# Patient Record
Sex: MALE | Race: WHITE | Employment: OTHER | URBAN - METROPOLITAN AREA
[De-identification: names, ages, dates, MRNs, and addresses within clinical notes are randomized per-mention and may not be internally consistent; named-entity substitution may affect disease eponyms.]

---

## 2023-10-10 ENCOUNTER — HOSPITAL ENCOUNTER (EMERGENCY)
Facility: HOSPITAL | Age: 78
Discharge: ANOTHER HOSPITAL | End: 2023-10-10
Attending: EMERGENCY MEDICINE

## 2023-10-10 ENCOUNTER — APPOINTMENT (OUTPATIENT)
Dept: CT IMAGING | Facility: HOSPITAL | Age: 78
End: 2023-10-10
Attending: EMERGENCY MEDICINE

## 2023-10-10 ENCOUNTER — TELEPHONE (OUTPATIENT)
Dept: CASE MANAGEMENT | Facility: HOSPITAL | Age: 78
End: 2023-10-10

## 2023-10-10 VITALS
OXYGEN SATURATION: 94 % | WEIGHT: 154.31 LBS | SYSTOLIC BLOOD PRESSURE: 149 MMHG | DIASTOLIC BLOOD PRESSURE: 78 MMHG | HEART RATE: 92 BPM | RESPIRATION RATE: 23 BRPM | TEMPERATURE: 99 F

## 2023-10-10 DIAGNOSIS — I73.9 PAD (PERIPHERAL ARTERY DISEASE) (HCC): Primary | ICD-10-CM

## 2023-10-10 DIAGNOSIS — I71.43 INFRARENAL ABDOMINAL AORTIC ANEURYSM (AAA) WITHOUT RUPTURE (HCC): ICD-10-CM

## 2023-10-10 LAB
ALBUMIN SERPL-MCNC: 3.1 G/DL (ref 3.4–5)
ALP LIVER SERPL-CCNC: 75 U/L
ALT SERPL-CCNC: 20 U/L
ANION GAP SERPL CALC-SCNC: 7 MMOL/L (ref 0–18)
APTT PPP: 44.2 SECONDS (ref 23.3–35.6)
AST SERPL-CCNC: 53 U/L (ref 15–37)
BASOPHILS # BLD AUTO: 0.05 X10(3) UL (ref 0–0.2)
BASOPHILS NFR BLD AUTO: 0.3 %
BILIRUB DIRECT SERPL-MCNC: 0.3 MG/DL (ref 0–0.2)
BILIRUB SERPL-MCNC: 2.2 MG/DL (ref 0.1–2)
BUN BLD-MCNC: 24 MG/DL (ref 7–18)
BUN/CREAT SERPL: 19.4 (ref 10–20)
CALCIUM BLD-MCNC: 8.1 MG/DL (ref 8.5–10.1)
CHLORIDE SERPL-SCNC: 105 MMOL/L (ref 98–112)
CO2 SERPL-SCNC: 28 MMOL/L (ref 21–32)
CREAT BLD-MCNC: 1.24 MG/DL
DEPRECATED RDW RBC AUTO: 50.5 FL (ref 35.1–46.3)
EGFRCR SERPLBLD CKD-EPI 2021: 60 ML/MIN/1.73M2 (ref 60–?)
EOSINOPHIL # BLD AUTO: 0.02 X10(3) UL (ref 0–0.7)
EOSINOPHIL NFR BLD AUTO: 0.1 %
ERYTHROCYTE [DISTWIDTH] IN BLOOD BY AUTOMATED COUNT: 14 % (ref 11–15)
GLUCOSE BLD-MCNC: 111 MG/DL (ref 70–99)
HCT VFR BLD AUTO: 45.2 %
HGB BLD-MCNC: 15 G/DL
IMM GRANULOCYTES # BLD AUTO: 0.08 X10(3) UL (ref 0–1)
IMM GRANULOCYTES NFR BLD: 0.5 %
LYMPHOCYTES # BLD AUTO: 0.77 X10(3) UL (ref 1–4)
LYMPHOCYTES NFR BLD AUTO: 5.1 %
MCH RBC QN AUTO: 32.1 PG (ref 26–34)
MCHC RBC AUTO-ENTMCNC: 33.2 G/DL (ref 31–37)
MCV RBC AUTO: 96.8 FL
MONOCYTES # BLD AUTO: 0.86 X10(3) UL (ref 0.1–1)
MONOCYTES NFR BLD AUTO: 5.7 %
NEUTROPHILS # BLD AUTO: 13.34 X10 (3) UL (ref 1.5–7.7)
NEUTROPHILS # BLD AUTO: 13.34 X10(3) UL (ref 1.5–7.7)
NEUTROPHILS NFR BLD AUTO: 88.3 %
OSMOLALITY SERPL CALC.SUM OF ELEC: 295 MOSM/KG (ref 275–295)
PLATELET # BLD AUTO: 139 10(3)UL (ref 150–450)
POTASSIUM SERPL-SCNC: 4.4 MMOL/L (ref 3.5–5.1)
PROT SERPL-MCNC: 6.4 G/DL (ref 6.4–8.2)
RBC # BLD AUTO: 4.67 X10(6)UL
SODIUM SERPL-SCNC: 140 MMOL/L (ref 136–145)
WBC # BLD AUTO: 15.1 X10(3) UL (ref 4–11)

## 2023-10-10 PROCEDURE — 80048 BASIC METABOLIC PNL TOTAL CA: CPT | Performed by: EMERGENCY MEDICINE

## 2023-10-10 PROCEDURE — 93005 ELECTROCARDIOGRAM TRACING: CPT

## 2023-10-10 PROCEDURE — 85025 COMPLETE CBC W/AUTO DIFF WBC: CPT | Performed by: EMERGENCY MEDICINE

## 2023-10-10 PROCEDURE — 99291 CRITICAL CARE FIRST HOUR: CPT

## 2023-10-10 PROCEDURE — 96374 THER/PROPH/DIAG INJ IV PUSH: CPT

## 2023-10-10 PROCEDURE — 75635 CT ANGIO ABDOMINAL ARTERIES: CPT | Performed by: EMERGENCY MEDICINE

## 2023-10-10 PROCEDURE — 85730 THROMBOPLASTIN TIME PARTIAL: CPT | Performed by: EMERGENCY MEDICINE

## 2023-10-10 PROCEDURE — 80076 HEPATIC FUNCTION PANEL: CPT | Performed by: EMERGENCY MEDICINE

## 2023-10-10 PROCEDURE — 93010 ELECTROCARDIOGRAM REPORT: CPT

## 2023-10-10 RX ORDER — HEPARIN SODIUM AND DEXTROSE 10000; 5 [USP'U]/100ML; G/100ML
INJECTION INTRAVENOUS CONTINUOUS
Status: CANCELLED | OUTPATIENT
Start: 2023-10-11

## 2023-10-10 RX ORDER — HYDROCODONE BITARTRATE AND ACETAMINOPHEN 5; 325 MG/1; MG/1
1 TABLET ORAL AS NEEDED
Status: ON HOLD | COMMUNITY
End: 2023-10-13

## 2023-10-10 RX ORDER — HEPARIN SODIUM 1000 [USP'U]/ML
60 INJECTION, SOLUTION INTRAVENOUS; SUBCUTANEOUS ONCE
Status: COMPLETED | OUTPATIENT
Start: 2023-10-10 | End: 2023-10-10

## 2023-10-10 RX ORDER — HEPARIN SODIUM AND DEXTROSE 10000; 5 [USP'U]/100ML; G/100ML
12 INJECTION INTRAVENOUS ONCE
Status: DISCONTINUED | OUTPATIENT
Start: 2023-10-10 | End: 2023-10-11

## 2023-10-10 RX ORDER — DOCUSATE CALCIUM 240 MG
CAPSULE ORAL
COMMUNITY

## 2023-10-10 RX ORDER — MIRTAZAPINE 7.5 MG/1
TABLET, FILM COATED ORAL NIGHTLY
COMMUNITY

## 2023-10-10 RX ORDER — GABAPENTIN 100 MG/1
CAPSULE ORAL
COMMUNITY

## 2023-10-10 RX ORDER — ACETAMINOPHEN 325 MG/1
TABLET ORAL
COMMUNITY

## 2023-10-10 NOTE — ED QUICK NOTES
This RN unable to obtain pedal pulse on pt's R foot, including with doppler. MD notified, at bedside, also unable to obtain pedal pulse.

## 2023-10-10 NOTE — ED INITIAL ASSESSMENT (HPI)
Pt arrives via EMS from [facility] for RLE pain since today, pt denies trauma. Pt states the pain is from is R foot to his R knee. Pt is A&Ox4/4  Ems noted pt to be hypertensive with  and oxygen saturation at 86%RA, was placed on 2LPM with improvement to 97%. Pt denies SOB.

## 2023-10-11 ENCOUNTER — HOSPITAL ENCOUNTER (INPATIENT)
Facility: HOSPITAL | Age: 78
LOS: 5 days | Discharge: NURSING, REHAB OR LONG-TERM CARE | DRG: 299 | End: 2023-10-16
Attending: STUDENT IN AN ORGANIZED HEALTH CARE EDUCATION/TRAINING PROGRAM | Admitting: STUDENT IN AN ORGANIZED HEALTH CARE EDUCATION/TRAINING PROGRAM
Payer: MEDICARE

## 2023-10-11 PROBLEM — I72.4 FEMORAL ARTERY ANEURYSM (HCC): Chronic | Status: ACTIVE | Noted: 2023-10-11

## 2023-10-11 PROBLEM — F03.90 MAJOR NEUROCOGNITIVE DISORDER (HCC): Status: ACTIVE | Noted: 2023-10-11

## 2023-10-11 PROBLEM — F03.90 DEMENTIA (HCC): Chronic | Status: ACTIVE | Noted: 2023-10-11

## 2023-10-11 PROBLEM — I73.9 COLD FOOT WITH PERIPHERAL VASCULAR DISEASE (HCC): Status: ACTIVE | Noted: 2023-10-11

## 2023-10-11 LAB
ANION GAP SERPL CALC-SCNC: 10 MMOL/L (ref 0–18)
ANTIBODY SCREEN: NEGATIVE
APTT PPP: 113.6 SECONDS (ref 23.3–35.6)
APTT PPP: 118.8 SECONDS (ref 23.3–35.6)
APTT PPP: 84 SECONDS (ref 23.3–35.6)
BUN BLD-MCNC: 31 MG/DL (ref 7–18)
CALCIUM BLD-MCNC: 8.4 MG/DL (ref 8.5–10.1)
CHLORIDE SERPL-SCNC: 104 MMOL/L (ref 98–112)
CO2 SERPL-SCNC: 25 MMOL/L (ref 21–32)
CREAT BLD-MCNC: 1.7 MG/DL
EGFRCR SERPLBLD CKD-EPI 2021: 41 ML/MIN/1.73M2 (ref 60–?)
ERYTHROCYTE [DISTWIDTH] IN BLOOD BY AUTOMATED COUNT: 14.1 %
GLUCOSE BLD-MCNC: 163 MG/DL (ref 70–99)
HCT VFR BLD AUTO: 43.8 %
HGB BLD-MCNC: 14.8 G/DL
MCH RBC QN AUTO: 32.5 PG (ref 26–34)
MCHC RBC AUTO-ENTMCNC: 33.8 G/DL (ref 31–37)
MCV RBC AUTO: 96.3 FL
OSMOLALITY SERPL CALC.SUM OF ELEC: 298 MOSM/KG (ref 275–295)
PLATELET # BLD AUTO: 105 10(3)UL (ref 150–450)
POTASSIUM SERPL-SCNC: 4 MMOL/L (ref 3.5–5.1)
Q-T INTERVAL: 390 MS
QRS DURATION: 140 MS
QTC CALCULATION (BEZET): 564 MS
R AXIS: 94 DEGREES
RBC # BLD AUTO: 4.55 X10(6)UL
RH BLOOD TYPE: POSITIVE
SODIUM SERPL-SCNC: 139 MMOL/L (ref 136–145)
T AXIS: -77 DEGREES
VENTRICULAR RATE: 126 BPM
WBC # BLD AUTO: 14.1 X10(3) UL (ref 4–11)

## 2023-10-11 PROCEDURE — 90792 PSYCH DIAG EVAL W/MED SRVCS: CPT | Performed by: OTHER

## 2023-10-11 PROCEDURE — 99223 1ST HOSP IP/OBS HIGH 75: CPT | Performed by: HOSPITALIST

## 2023-10-11 RX ORDER — ONDANSETRON 2 MG/ML
4 INJECTION INTRAMUSCULAR; INTRAVENOUS EVERY 6 HOURS PRN
Status: DISCONTINUED | OUTPATIENT
Start: 2023-10-11 | End: 2023-10-11

## 2023-10-11 RX ORDER — HEPARIN SODIUM AND DEXTROSE 10000; 5 [USP'U]/100ML; G/100ML
18 INJECTION INTRAVENOUS ONCE
Qty: 250 ML | Refills: 0 | Status: COMPLETED | OUTPATIENT
Start: 2023-10-11 | End: 2023-10-11

## 2023-10-11 RX ORDER — MORPHINE SULFATE 4 MG/ML
4 INJECTION, SOLUTION INTRAMUSCULAR; INTRAVENOUS EVERY 2 HOUR PRN
Status: DISCONTINUED | OUTPATIENT
Start: 2023-10-11 | End: 2023-10-16

## 2023-10-11 RX ORDER — SODIUM CHLORIDE 9 MG/ML
INJECTION, SOLUTION INTRAVENOUS CONTINUOUS
Status: DISCONTINUED | OUTPATIENT
Start: 2023-10-11 | End: 2023-10-12

## 2023-10-11 RX ORDER — MORPHINE SULFATE 2 MG/ML
1 INJECTION, SOLUTION INTRAMUSCULAR; INTRAVENOUS EVERY 2 HOUR PRN
Status: DISCONTINUED | OUTPATIENT
Start: 2023-10-11 | End: 2023-10-16

## 2023-10-11 RX ORDER — HYDROCODONE BITARTRATE AND ACETAMINOPHEN 5; 325 MG/1; MG/1
1 TABLET ORAL EVERY 4 HOURS PRN
Status: DISCONTINUED | OUTPATIENT
Start: 2023-10-11 | End: 2023-10-16

## 2023-10-11 RX ORDER — METOCLOPRAMIDE HYDROCHLORIDE 5 MG/ML
5 INJECTION INTRAMUSCULAR; INTRAVENOUS EVERY 8 HOURS PRN
Status: DISCONTINUED | OUTPATIENT
Start: 2023-10-11 | End: 2023-10-16

## 2023-10-11 RX ORDER — BISACODYL 10 MG
10 SUPPOSITORY, RECTAL RECTAL
Status: DISCONTINUED | OUTPATIENT
Start: 2023-10-11 | End: 2023-10-16

## 2023-10-11 RX ORDER — POLYETHYLENE GLYCOL 3350 17 G/17G
17 POWDER, FOR SOLUTION ORAL DAILY PRN
Status: DISCONTINUED | OUTPATIENT
Start: 2023-10-11 | End: 2023-10-16

## 2023-10-11 RX ORDER — HEPARIN SODIUM AND DEXTROSE 10000; 5 [USP'U]/100ML; G/100ML
INJECTION INTRAVENOUS CONTINUOUS
Status: DISCONTINUED | OUTPATIENT
Start: 2023-10-11 | End: 2023-10-13

## 2023-10-11 RX ORDER — SENNOSIDES 8.6 MG
17.2 TABLET ORAL NIGHTLY PRN
Status: DISCONTINUED | OUTPATIENT
Start: 2023-10-11 | End: 2023-10-16

## 2023-10-11 RX ORDER — ENEMA 19; 7 G/133ML; G/133ML
1 ENEMA RECTAL ONCE AS NEEDED
Status: DISCONTINUED | OUTPATIENT
Start: 2023-10-11 | End: 2023-10-16

## 2023-10-11 RX ORDER — MELATONIN
3 NIGHTLY PRN
Status: DISCONTINUED | OUTPATIENT
Start: 2023-10-11 | End: 2023-10-16

## 2023-10-11 RX ORDER — HYDROCODONE BITARTRATE AND ACETAMINOPHEN 5; 325 MG/1; MG/1
2 TABLET ORAL EVERY 4 HOURS PRN
Status: DISCONTINUED | OUTPATIENT
Start: 2023-10-11 | End: 2023-10-16

## 2023-10-11 RX ORDER — MORPHINE SULFATE 2 MG/ML
2 INJECTION, SOLUTION INTRAMUSCULAR; INTRAVENOUS EVERY 2 HOUR PRN
Status: DISCONTINUED | OUTPATIENT
Start: 2023-10-11 | End: 2023-10-16

## 2023-10-11 RX ORDER — ACETAMINOPHEN 325 MG/1
650 TABLET ORAL EVERY 4 HOURS PRN
Status: DISCONTINUED | OUTPATIENT
Start: 2023-10-11 | End: 2023-10-16

## 2023-10-11 RX ORDER — HEPARIN SODIUM 1000 [USP'U]/ML
80 INJECTION, SOLUTION INTRAVENOUS; SUBCUTANEOUS ONCE
Status: DISCONTINUED | OUTPATIENT
Start: 2023-10-11 | End: 2023-10-11

## 2023-10-11 RX ORDER — GABAPENTIN 100 MG/1
100 CAPSULE ORAL 2 TIMES DAILY
Status: DISCONTINUED | OUTPATIENT
Start: 2023-10-11 | End: 2023-10-16

## 2023-10-11 NOTE — PROGRESS NOTES
Patient evaluated again this am   Sleeping when I came in  No severe pain in right foot   No signals in either foot, movement appears to be his limited baseline in both legs. He reaffirmed that he does not walk, can stand limited    I discussed again the findings of the ct scan and he seemed to have more understanding. I discussed need to treat aneurysm first and then consider bypass if there is a target based on lower extremity extremity angiogram. Discussed also possibility that he may only be candidate for amputation   He repeated that he understood what was going on but could not repeat what we discussed three times. Once last night and twice this morning.      Will plan endovascular abdominal aortic aneurysm repair and angiogram of both legs, ideally need psych to evaluate for his ability to consent

## 2023-10-11 NOTE — PROGRESS NOTES
D/w Dr. Kailee Hurd  Pt seen and examined  Plan for vascular surgery later today   Psych evaluation per Dr. Kailee Hurd request  NPO  Start IVF

## 2023-10-11 NOTE — CM/SW NOTE
CM asked to assist in searching for emergency contact. Office visit in 2- listed Nash Weathers as emergency contact # [phone]. LM requesting a return call.     Orion Phipps MSN RN,

## 2023-10-11 NOTE — CM/SW NOTE
[facility] ETA 2205  GOING TO [place] ROOM 7605  REPORT TO Melita Hall [phone]  PCS FORM IS COMPLETED      [employer] MSN, KILO, RN  Emergency Department   Extension [id number]

## 2023-10-11 NOTE — PROGRESS NOTES
Direct admit from [place] [facility]. Pt has c/in due to c/o R leg pain. reported to have no pedal pulses to both feet. 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 R leg appears to be cold, no pedal pulses palpable or even thru doppler but no mottling. L leg has pedal pulse thru doppler. Pt has stated that he has sharp pain intermittently but has not asked for any pain medication, pt has been resting well.  VSS

## 2023-10-11 NOTE — PLAN OF CARE
Assumed care at [facility] and oriented x3  NSR on tele and 1L NC   Denies any pain/discomfort   NPO discontinued, cardiac diet   NPO will resume at midnight   Surgery tomorrow for abdominal aortic aneurysm repair and angiogram of bilateral lower extremities  Needs met   Call light within reach
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
## 2023-10-11 NOTE — H&P
[facility]     Jean Mulligan Patient Status:  Inpatient    1945 MRN [id number]   [facility] 7NE-A Attending Saida Chisholm [address] Day # 0 PCP PHYSICIAN NONSTAFF     Chief Complaint: Rt lower leg pain    Subjective:    History of Present Illness:     Jean Mulligan is a 66year old male with history of peripheral vascular disease, depression, dementia, anxiety presents the emergency room with rt lower leg pain. Patient states that started this morning. Describes as a sharp pain rating 7 out of 10 constant. No radiation of the pain no exacerbating remitting factors. Patient also states that he feels that the right lower leg is colder than the left. Patient denies any numbness or tingling in the lower extremity. No fevers, chills, nausea, vomiting, diarrhea, constipation. History/Other:    Past Medical History:  Past Medical History:   Diagnosis Date    Anxiety     Dementia ([facility]     Depression     Insomnia     Peripheral vascular disease ([facility]     Spinal stenosis     lumbar region, without neurogenic claudication    Unilateral inguinal hernia      Past Surgical History:   No past surgical history on file. Family History:   No family history on file. Social History:          Allergies: No Known Allergies    Medications:  [COMPLETED] iopamidol 76% (ISOVUE-370) injection for power injector, 80 mL, Intravenous, ONCE PRN, Kenzie Ram MD, 85 mL at 10/10/23 1952  [COMPLETED] heparin (Porcine) 1000 UNIT/ML injection - BOLUS IV 4,200 Units, 60 Units/kg, Intravenous, Once, Kenzie Ram MD, 4,200 Units at 10/10/23 2155    acetaminophen 325 MG Oral Tab, Take by mouth., Disp: , Rfl:   Cholecalciferol 1.25 MG (54113 UT) Oral Tab, Take by mouth., Disp: , Rfl:   mirtazapine 7.5 MG Oral Tab, Take by mouth nightly., Disp: , Rfl:   docusate calcium 240 MG Oral Cap, Take by mouth., Disp: , Rfl:   gabapentin 100 MG Oral Cap, Take by mouth., Disp: , Rfl: HYDROcodone-acetaminophen 5-325 MG Oral Tab, Take 1 tablet by mouth as needed for Pain., Disp: , Rfl:         Review of Systems:   A comprehensive review of systems was completed. Pertinent positives and negatives noted in the HPI. Objective:   Physical Exam:    There were no vitals taken for this visit. General: No acute distress, Alert  Respiratory: No rhonchi, no wheezes  Cardiovascular: S1, S2. Regular rate and rhythm  Abdomen: Soft, Non-tender, non-distended, positive bowel sounds  Neuro: No new focal deficits  Extremities: No edema      Results:    Labs:      Labs Last 24 Hours:    Recent Labs   Lab 10/10/23  1821   RBC 4.67   HGB 15.0   HCT 45.2   MCV 96.8   MCH 32.1   MCHC 33.2   RDW 14.0   NEPRELIM 13.34*   WBC 15.1*   .0*       Recent Labs   Lab 10/10/23  1936   *   BUN 24*   CREATSERUM 1.24   EGFRCR 60   CA 8.1*   ALB 3.1*      K 4.4      CO2 28.0   ALKPHO 75   AST 53*   ALT 20   BILT 2.2*   TP 6.4       No results found for: \"PT\", \"INR\"    No results for input(s): \"TROP\", \"TROPHS\", \"CK\" in the last 168 hours. No results for input(s): \"TROP\", \"PBNP\" in the last 168 hours. No results for input(s): \"PCT\" in the last 168 hours. Imaging: Imaging data reviewed in Epic. 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 Patient also has occlusion of the left superficial femoral artery as well as a 2.4 aneurysm at the common femoral artery.   -We will continue heparin drip   -Vascular surgery on consult. #Acute renal failure   -Likely due to contrast nephropathy considering that the creatinine was normal at [facility] in the ER.   -We will continue IV fluids   -Repeat BMP in the morning. #Dementia   -Patient currently not on any medications.   -We will consult psychiatry for decisional capacity.     #Pseudo hypocalcemia   -Corrected for albumin is a normal level.              Plan of care discussed with pt at [facility]. Nisha Jones,     Supplementary Documentation:     The 21st Century Cures Act makes medical notes like these available to patients in the interest of transparency. Please be advised this is a medical document. Medical documents are intended to carry relevant information, facts as evident, and the clinical opinion of the practitioner. The medical note is intended as peer to peer communication and may appear blunt or direct. It is written in medical language and may contain abbreviations or verbiage that are unfamiliar. **Certification      PHYSICIAN Certification of Need for Inpatient Hospitalization - Initial Certification    Patient will require inpatient services that will reasonably be expected to span two midnight's based on the clinical documentation in H+P. Based on patients current state of illness, I anticipate that, after discharge, patient will require TBD.

## 2023-10-12 LAB
ANION GAP SERPL CALC-SCNC: 7 MMOL/L (ref 0–18)
APTT PPP: 71.1 SECONDS (ref 23.3–35.6)
BASOPHILS # BLD AUTO: 0.1 X10(3) UL (ref 0–0.2)
BASOPHILS NFR BLD AUTO: 0.7 %
BUN BLD-MCNC: 46 MG/DL (ref 7–18)
CALCIUM BLD-MCNC: 7.6 MG/DL (ref 8.5–10.1)
CHLORIDE SERPL-SCNC: 112 MMOL/L (ref 98–112)
CO2 SERPL-SCNC: 23 MMOL/L (ref 21–32)
CREAT BLD-MCNC: 2.12 MG/DL
EGFRCR SERPLBLD CKD-EPI 2021: 31 ML/MIN/1.73M2 (ref 60–?)
EOSINOPHIL # BLD AUTO: 0.1 X10(3) UL (ref 0–0.7)
EOSINOPHIL NFR BLD AUTO: 0.7 %
ERYTHROCYTE [DISTWIDTH] IN BLOOD BY AUTOMATED COUNT: 14.6 %
GLUCOSE BLD-MCNC: 79 MG/DL (ref 70–99)
GLUCOSE BLD-MCNC: 81 MG/DL (ref 70–99)
HCT VFR BLD AUTO: 38.9 %
HGB BLD-MCNC: 12.8 G/DL
IMM GRANULOCYTES # BLD AUTO: 0.05 X10(3) UL (ref 0–1)
IMM GRANULOCYTES NFR BLD: 0.4 %
LYMPHOCYTES # BLD AUTO: 2.08 X10(3) UL (ref 1–4)
LYMPHOCYTES NFR BLD AUTO: 15 %
MAGNESIUM SERPL-MCNC: 1.8 MG/DL (ref 1.6–2.6)
MCH RBC QN AUTO: 32.2 PG (ref 26–34)
MCHC RBC AUTO-ENTMCNC: 32.9 G/DL (ref 31–37)
MCV RBC AUTO: 98 FL
MONOCYTES # BLD AUTO: 1.3 X10(3) UL (ref 0.1–1)
MONOCYTES NFR BLD AUTO: 9.4 %
NEUTROPHILS # BLD AUTO: 10.24 X10 (3) UL (ref 1.5–7.7)
NEUTROPHILS # BLD AUTO: 10.24 X10(3) UL (ref 1.5–7.7)
NEUTROPHILS NFR BLD AUTO: 73.8 %
OSMOLALITY SERPL CALC.SUM OF ELEC: 305 MOSM/KG (ref 275–295)
PLATELET # BLD AUTO: 112 10(3)UL (ref 150–450)
PLATELET # BLD AUTO: 112 10(3)UL (ref 150–450)
POTASSIUM SERPL-SCNC: 4.1 MMOL/L (ref 3.5–5.1)
RBC # BLD AUTO: 3.97 X10(6)UL
SODIUM SERPL-SCNC: 142 MMOL/L (ref 136–145)
WBC # BLD AUTO: 13.9 X10(3) UL (ref 4–11)

## 2023-10-12 PROCEDURE — 99232 SBSQ HOSP IP/OBS MODERATE 35: CPT | Performed by: HOSPITALIST

## 2023-10-12 RX ORDER — MAGNESIUM OXIDE 400 MG/1
400 TABLET ORAL ONCE
Status: COMPLETED | OUTPATIENT
Start: 2023-10-12 | End: 2023-10-12

## 2023-10-12 RX ORDER — SODIUM CHLORIDE 9 MG/ML
INJECTION, SOLUTION INTRAVENOUS CONTINUOUS
Status: ACTIVE | OUTPATIENT
Start: 2023-10-12 | End: 2023-10-13

## 2023-10-12 NOTE — CM/SW NOTE
CM discussed signing of consents for surgery with Tanya Cuba. Discussion relayed to surgeon. This writer spoke with Jurgen Hernandez at [facility] who confirmed that pt is full code per their record that were updated on 3-15-33--22. Inquired why facility did not have any contacts on file. Per Jurgen Hernandez pt was dropped off at facility and they have had no contact with family. PC on consult. / to remain available for support and/or discharge planning.       Tone Wray, MSN RN, CM

## 2023-10-12 NOTE — CONSULTS
Palliative Care  Palliative Care Consult request from Dr. Narinder Shipley noted requesting discussion for goals of care. Chart reviewed noting evaluation by Dr. Aidan Gilbert and others deeming the patient not decisional for complex medical decisions. Appreciate SW/CM outreach to look into complex social hx and lack of HCPOA or healthcare decision maker for more answers. Note initial contacts have been attempted to reach Mitchell Spence listed as emergency contact from prior healthcare encounter. Will await outcome and reach out to emergency contact if located. Consultation note to follow pending d/w family/healthcare surrogate.      Thank you for the referral.    HEATHER Felix  Palliative Care   Phone: [phone]     10/12/2023  9:12 AM

## 2023-10-12 NOTE — PLAN OF CARE
Assumed care @ 0730. Alert and orientated X1-2. Drowsy this AM, patient more awake during afternoon. ST; NSR on tele, RA, KAITY. 2 minutes of SVT on tele, MD notified. Mg draw, replaced per order. Heparin infusing per order, see MAR. Next PTT @ 0511. Patient declines pain. Voiding per urinal, adequate urine output. Pedal pulses per doppler. Palliative care consulted. Safety precautions in place, call light within reach.
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
## 2023-10-12 NOTE — PROGRESS NOTES
Attempted to call family     Given dementia, question benefit of doing multiple vascular procedures on patient with significant cognitive decline. Will evaluate patient later today.      Need palliative input

## 2023-10-12 NOTE — PROGRESS NOTES
Patient with no pain and normal movement of foot and normal sensation  No need for any urgent revascularization or endovascular abdominal aortic aneurysm repair   Will cancel   I spoke with him and his understanding is limited due to dementia and he kept his eyes closed for majority of conversation     Will plan conservative approach for now   Can eat

## 2023-10-13 PROBLEM — I73.9 PAD (PERIPHERAL ARTERY DISEASE) (HCC): Status: ACTIVE | Noted: 2023-10-11

## 2023-10-13 LAB
ANION GAP SERPL CALC-SCNC: 4 MMOL/L (ref 0–18)
APTT PPP: 74.7 SECONDS (ref 23.3–35.6)
BUN BLD-MCNC: 39 MG/DL (ref 7–18)
CALCIUM BLD-MCNC: 7.5 MG/DL (ref 8.5–10.1)
CHLORIDE SERPL-SCNC: 112 MMOL/L (ref 98–112)
CO2 SERPL-SCNC: 26 MMOL/L (ref 21–32)
CREAT BLD-MCNC: 1.49 MG/DL
EGFRCR SERPLBLD CKD-EPI 2021: 48 ML/MIN/1.73M2 (ref 60–?)
GLUCOSE BLD-MCNC: 82 MG/DL (ref 70–99)
MAGNESIUM SERPL-MCNC: 1.7 MG/DL (ref 1.6–2.6)
OSMOLALITY SERPL CALC.SUM OF ELEC: 302 MOSM/KG (ref 275–295)
PLATELET # BLD AUTO: 122 10(3)UL (ref 150–450)
POTASSIUM SERPL-SCNC: 3.9 MMOL/L (ref 3.5–5.1)
SODIUM SERPL-SCNC: 142 MMOL/L (ref 136–145)

## 2023-10-13 PROCEDURE — 99232 SBSQ HOSP IP/OBS MODERATE 35: CPT | Performed by: INTERNAL MEDICINE

## 2023-10-13 RX ORDER — ASPIRIN 81 MG/1
81 TABLET ORAL DAILY
Qty: 90 TABLET | Refills: 0 | Status: SHIPPED | OUTPATIENT
Start: 2023-10-14 | End: 2023-10-16

## 2023-10-13 RX ORDER — HYDROCODONE BITARTRATE AND ACETAMINOPHEN 5; 325 MG/1; MG/1
1 TABLET ORAL AS NEEDED
Qty: 15 TABLET | Refills: 0 | Status: SHIPPED | OUTPATIENT
Start: 2023-10-13

## 2023-10-13 RX ORDER — ATORVASTATIN CALCIUM 40 MG/1
40 TABLET, FILM COATED ORAL NIGHTLY
Qty: 90 TABLET | Refills: 0 | Status: SHIPPED | OUTPATIENT
Start: 2023-10-13 | End: 2023-10-16

## 2023-10-13 RX ORDER — ASPIRIN 81 MG/1
81 TABLET ORAL DAILY
Status: DISCONTINUED | OUTPATIENT
Start: 2023-10-13 | End: 2023-10-16

## 2023-10-13 RX ORDER — MAGNESIUM OXIDE 400 MG/1
400 TABLET ORAL ONCE
Status: COMPLETED | OUTPATIENT
Start: 2023-10-13 | End: 2023-10-13

## 2023-10-13 NOTE — CM/SW NOTE
WADE placed call to [facility] [phone] and asked to speak with . Was initially transferred to Mary Anne Gallo, who is in Sales. He forwarded call to Radha White who is the . VM left around 11:00 AM asking for call back. Per , [facility] Pt is unable to verify or is uncertain on services that are provided. It will be unsafe to DC him back to [facility] if unable to confirm if services are in place for pt safety. ROEL ref sent to [facility] as he has been there before. [facility] liaisons, Petr Prince and Raul, will also try to reach out to [facility] as they have a working relationship with them. Liaisons reaching out to [facility] to see if able to accept for ROEL. Updates to follow. Addendum 2:45- call placed to [facility], no response by reception and unable to leave message. Follow up message sent to [facility] in [place] to inquire if able to accept for ROEL.     3:00- Call received from Radha White at [facility]. Confirmed pt is in [facility]. Per Radha White pt is \"pretty oriented\" at baseline however does keep to himself. WADE asked about medications and pt's comments of going downstairs and Radha White confirmed there is a service at [facility] for that. [facility] able to add services on to meet pt needs. Aware pt is medically cleared for DC. Due to timing and the weekend coming up, agreed it is safer for pt to DC on Monday to ensure a new assessment gets completed. Message sent to RN and MD. Suzanne Briceño [facility] referral to [address] as they are contracted with [facility]. Will fax referral to [facility] at [phone].      Notified [facility] and cancelled referral.     Bobby Meza, [facility]  Discharge 2011 [facility]

## 2023-10-13 NOTE — PROGRESS NOTES
Palliative Care   Chart reviewed and spoke with Alyson OLVERA and Dr. Junior Hester regarding appropriateness of Palliative Care consult. Futile efforts to locate/communicate with patient's brother who is only contact available. As patient is not decisional to direct GOC and advanced care planning discussions and continues to demonstrate inconsistent responses and lack of knowledge regarding current clinical state or living situation, per d/w Dr. Junior Hester will cancel consult/sign off. Appreciate SW/CM efforts to have safe DC plan. Please re-consult if family is contacted.      HEATHER Daniels  Palliative Care   Phone: [phone]     10/13/2023  11:52 AM

## 2023-10-13 NOTE — PLAN OF CARE
Assumed care @ 0730. Alert and orientated X1-2. NSR on tele, RA, VSS. Patient denies pain. Heparin gtt discontinued per order. Transitioned to Eliquis and Aspirin, see MAR. Mg replaced per protocol, see MAR. Voiding per urinal, adequate urine output. Pedal pulses per doppler. Safety precautions in place, call light within reach.

## 2023-10-13 NOTE — PROGRESS NOTES
Patient resting   Does not recall any of our previous conversations about his aneurysms  Normal movement and sensation in right leg     No plan for surgery given overall clinical picture   Plan to approach conservative with no intervention except amputation if that is ever required  Stop heparin   Begin aspirin and xarelto 2.5 bid   Can be discharged from vascular standpoint

## 2023-10-13 NOTE — PLAN OF CARE
Assumed care at approximate 1930. A & O x 4.   2 L O2 NC. NSR/ ST on tele. Patient denies pain. IVFs infusing per order. Voiding per urinal, adequate urine output. Pedal pulses per doppler. Heparin gtt infusing per protocol. Palliative care to see. Patient updated on plan of care.

## 2023-10-13 NOTE — PHYSICAL THERAPY NOTE
PHYSICAL THERAPY EVALUATION - INPATIENT     Room Number: 5942/1265-L  Evaluation Date: 10/13/2023  Type of Evaluation: Initial  Physician Order: PT Eval and Treat    Presenting Problem: R lower leg pain  Co-Morbidities : PVD, depression, dementia, anxiety  Reason for Therapy: Mobility Dysfunction and Discharge Planning    History related to current admission: Patient is a 66year old male admitted on 10/11/2023 from home for RLE pain. Pt diagnosed with RLE occlusion, acute renal failure. ASSESSMENT   In this PT evaluation, the patient presents with the following impairments decreased strength, functional mobility, trunk control, endurance, balance. These impairments and comorbidities manifest themselves as functional limitations in independent bed mobility, transfers, and gait. The patient is below baseline and would benefit from skilled inpatient PT to address the above deficits to assist patient in returning to prior to level of function. Functional outcome measures completed include [facility]. The AM-PAC '6-Clicks' Inpatient Basic Mobility Short Form was completed and this patient is demonstrating a Approx Degree of Impairment: 50.57%  degree of impairment in mobility. Research supports that patients with this level of impairment may benefit from [address] Pt would also benefit from 24hr supervision due to poor safety awareness, decreased strength and balance, and cognition status. DISCHARGE RECOMMENDATIONS  PT Discharge Recommendations: Home with home health PT;24 hour care/supervision    PLAN  PT Treatment Plan: Bed mobility; Body mechanics; Endurance; Energy conservation;Patient education; Family education;Gait training;Balance training;Transfer training;Strengthening  Rehab Potential : Fair  Frequency (Obs): 3x/week  Number of Visits to Meet Established Goals: 6      CURRENT GOALS    Goal #1 Patient is able to demonstrate supine - sit EOB @ level: modified independent     Goal #2 Patient is able to demonstrate transfers EOB to/from Chair/Wheelchair at assistance level: supervision     Goal #3 Patient is able to ambulate 10 feet with assist device: walker - rolling at assistance level: modified independent     Goal #4    Goal #5    Goal #6    Goal Comments: Goals established on 10/13/2023    HOME SITUATION  Type of Home:  ([address] [facility] [facility]   Home Layout: One level                Lives With: Staff 24 hours     Patient Owned Equipment: Rolling walker; Wheelchair       Prior Level of [place]: Pt poor historian. Unable to reach [facility] for [place], and no pt contacts listed in chart. Lives at [facility]. Reports that he mainly uses wc for mobility, but that he does not bring wc into the bathroom. Addendum: communicated with SW - pt able to be assessed at his ILF for increased needs and obtain assistance with ADLs, transfers, status checks. SUBJECTIVE  \"I like scrambled eggs\"       OBJECTIVE  Precautions: Bed/chair alarm  Fall Risk: High fall risk    WEIGHT BEARING RESTRICTION  Weight Bearing Restriction: None                PAIN ASSESSMENT  Ratin          COGNITION  Overall Cognitive Status:  Impaired    RANGE OF MOTION AND STRENGTH ASSESSMENT  See OT note for UE assessment    Lower extremity ROM is within functional limits      Lower extremity strength is grossly 3+/5      BALANCE  Static Sitting: Fair -  Dynamic Sitting: Poor +  Static Standing: Poor +  Dynamic Standing: Poor +    ADDITIONAL TESTS                                    ACTIVITY TOLERANCE                         O2 WALK       NEUROLOGICAL FINDINGS                        AM-PAC '6-Clicks' INPATIENT SHORT FORM - BASIC MOBILITY  How much difficulty does the patient currently have. ..   Patient Difficulty: Turning over in bed (including adjusting bedclothes, sheets and blankets)?: A Little   Patient Difficulty: Sitting down on and standing up from a chair with arms (e.g., wheelchair, bedside commode, etc.): A Little   Patient Difficulty: Moving from lying on back to sitting on the side of the bed?: A Lot   How much help from another person does the patient currently need. .. Help from Another: Moving to and from a bed to a chair (including a wheelchair)?: A Little   Help from Another: Need to walk in hospital room?: A Little   Help from Another: Climbing 3-5 steps with a railing?: A Little       AM-PAC Score:  Raw Score: 17   Approx Degree of Impairment: 50.57%   Standardized Score (AM-PAC Scale): 42.13   CMS Modifier (G-Code): CK    FUNCTIONAL ABILITY STATUS  Gait Assessment   Functional Mobility/Gait Assessment  Gait Assistance: Contact guard assist  Distance (ft): 10  Assistive Device: Rolling walker  Pattern:  (anteropulsion of RW, crouched, cervical flexion)    Skilled Therapy Provided     Gait Training:   Pt cued on upright standing posture to improve alignment with UB; pt with increased crouching during ambulation   Pt cued on gait sequencing with RW - pt with significant anteropulsion of RW, req frequent cuing during 10ft to maintain RW close to COM. Pt cued on proper UE placement on RW handles    Therapeutic Activity:   Pt cued on placement of UE and LEs for optimal force generation and safe STS transfer. Pt cued on usage of arm rests for controlled descent into sitting  Pt cued on close placement of RW prior to stand>sit; pt req cuing for RW mgmt        Bed Mobility:  Rolling: NT  Supine to sit: NT   Sit to supine: NT     Transfer Mobility:  Sit to stand: CGA   Stand to sit: CGA  Gait = CGA  Pivot chair>bed: CGA    Therapist's Comments: RN cleared for session. Pt agreeable for therapy, received up in chair. Instructed to call for nursing staff for any needs and OOB mobility.        Exercise/Education Provided:  Bed mobility  Body mechanics  Energy conservation  Functional activity tolerated  Gait training  Posture  Strengthening  Transfer training    Patient End of Session: Up in chair;RN aware of session/findings;Call light within reach; Needs met; All patient questions and concerns addressed; Alarm set; Discussed recommendations with /      Patient Evaluation Complexity Level:  History Moderate - 1 or 2 personal factors and/or co-morbidities   Examination of body systems Low - addressing 1-2 elements   Clinical Presentation Low - Stable   Clinical Decision Making Low - Stable       PT Session Time: 25 minutes  Gait Trainin minutes  Therapeutic Activity: 10 minutes

## 2023-10-14 LAB — MAGNESIUM SERPL-MCNC: 1.8 MG/DL (ref 1.6–2.6)

## 2023-10-14 PROCEDURE — 99232 SBSQ HOSP IP/OBS MODERATE 35: CPT | Performed by: HOSPITALIST

## 2023-10-14 RX ORDER — MAGNESIUM OXIDE 400 MG/1
400 TABLET ORAL ONCE
Status: COMPLETED | OUTPATIENT
Start: 2023-10-14 | End: 2023-10-14

## 2023-10-14 NOTE — PLAN OF CARE
Assumed care at approximate 1930. A & O x 4.   2 L O2 NC overnight. NSR on tele. Patient denies pain. IVFs infusing per order. Voiding per urinal, adequate urine output. Pedal pulses per doppler. Call light within reach. Patient updated on plan of care.

## 2023-10-14 NOTE — PROGRESS NOTES
Exam unchanged   Dementia unchanged - does not recall any of previous conversations  Moving right foot   No pain     No plan for any surgery   Can be discharged   Continue aspirin and xarelto

## 2023-10-14 NOTE — PROGRESS NOTES
Assumed care around 0700, A/Ox4, Confused at times. R/A. Intermittent 2LNC. Denies pain. IVSL. Voids per urinal. Good output. Pedal pulses per doppler. Vascular surgery s/o. PT rec is ROEL. Placement pending. Cantaloupe sized hernia.

## 2023-10-15 LAB — MAGNESIUM SERPL-MCNC: 1.5 MG/DL (ref 1.6–2.6)

## 2023-10-15 PROCEDURE — 99232 SBSQ HOSP IP/OBS MODERATE 35: CPT | Performed by: HOSPITALIST

## 2023-10-15 RX ORDER — ATORVASTATIN CALCIUM 40 MG/1
40 TABLET, FILM COATED ORAL NIGHTLY
Status: DISCONTINUED | OUTPATIENT
Start: 2023-10-15 | End: 2023-10-16

## 2023-10-15 RX ORDER — ATORVASTATIN CALCIUM 20 MG/1
20 TABLET, FILM COATED ORAL NIGHTLY
Status: DISCONTINUED | OUTPATIENT
Start: 2023-10-15 | End: 2023-10-15

## 2023-10-15 RX ORDER — MAGNESIUM OXIDE 400 MG/1
800 TABLET ORAL ONCE
Status: COMPLETED | OUTPATIENT
Start: 2023-10-15 | End: 2023-10-15

## 2023-10-15 NOTE — PLAN OF CARE
Assumed pt care at 0730  A&Ox4 w/ some forgetfulness, able to make needs known  1x assist w/ walker  Small Bmx1  Waiting for placement  Call light in reach  Bed in low position

## 2023-10-15 NOTE — PLAN OF CARE
Assume care at 1930  VSS  A&Ox4, can be forgetful at times  RA  NS  Denies pain  Pedal pulses per doppler  Plan of care discussed with patient

## 2023-10-16 VITALS
BODY MASS INDEX: 23.74 KG/M2 | HEART RATE: 102 BPM | RESPIRATION RATE: 21 BRPM | DIASTOLIC BLOOD PRESSURE: 67 MMHG | SYSTOLIC BLOOD PRESSURE: 156 MMHG | OXYGEN SATURATION: 91 % | TEMPERATURE: 99 F | HEIGHT: 65.98 IN | WEIGHT: 147.69 LBS

## 2023-10-16 LAB — MAGNESIUM SERPL-MCNC: 1.6 MG/DL (ref 1.6–2.6)

## 2023-10-16 PROCEDURE — 99239 HOSP IP/OBS DSCHRG MGMT >30: CPT | Performed by: HOSPITALIST

## 2023-10-16 RX ORDER — ATORVASTATIN CALCIUM 40 MG/1
40 TABLET, FILM COATED ORAL NIGHTLY
Qty: 90 TABLET | Refills: 0 | Status: SHIPPED | OUTPATIENT
Start: 2023-10-16

## 2023-10-16 RX ORDER — ASPIRIN 81 MG/1
81 TABLET ORAL DAILY
Qty: 90 TABLET | Refills: 0 | Status: SHIPPED | OUTPATIENT
Start: 2023-10-16

## 2023-10-16 NOTE — CM/SW NOTE
10/16/23 0844   Discharge disposition   Expected discharge disposition [facility]   Post Acute Care Provider   (DEBBIE Ny )   Additional Home Care/Hospice Provider   (Ramirez Sanchez)   Discharge transportation [address] at [facility] via [id number] [place], [place] [address]y [address] transport to [place] [address]. PCS completed.      Anastasiia Mann, IVELISSE  Discharge [facility]

## 2023-10-16 NOTE — CM/SW NOTE
Call placed to [address] [place] [address], [place] [address]  [phone]. Left VM to discuss DC planning. Informed that referral was sent to [address] [place] [address] and they accepted. Inquired on medications and their process. Updated RN and OP Pharm. Updates to follow on DC plans to [place] [address]. Will need to confirm with Admissions on DC time to ensure a safe DC.     8:40- Call received from [address] [place] [address] at [place] [address]. Prefers admission before 3pm. Inquired on new medication and scripts are preferred. Let OP Pharm know. Report does NOT need to be called, SW will fax updates to [place] [address]. Discussed with RN DC plans, scheduled [facility] transport for 1pm. PCS completed. [place] [address]  [address] [facility]  Main: [phone]  Fax: [phone]    Will update pt on DC plans.      IVELISSE Walton  Discharge 2011 [facility]

## 2023-10-16 NOTE — PLAN OF CARE
Assumed care at 0730  A&O x4, forgetful at times   RA  NSR-ST   VSS   Up 1 and walker   Denies pain  Pedal pulses via Doppler      All consults clear for DC  IV removed  To [facility] facility via [facility]

## 2023-10-17 NOTE — DISCHARGE SUMMARY
[facility] HOSPITALIST  DISCHARGE SUMMARY     Yaima Alvarado Patient Status:  Inpatient    1945 MRN [id number]   [facility] 7NE-A Attending No att. 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 - Multiple attempts to contact family per staff  - not decisional     #Pseudo hypocalcemia -Corrected for albumin is a normal level. #Brief asymptomatic SVT- monitor      DC planning- waiting on placement   Medically cleared     Lace+ Score: 75  59-90 High Risk  29-58 Medium Risk  0-28   Low Risk. Risk of readmission: Yaima Alvarado has High Risk of readmission after discharge from the hospital.    History of Present Illness: Yaima Alvarado is a 66year old male with history of peripheral vascular disease, depression, dementia, anxiety presents the emergency room with rt lower leg pain. Patient states that started this morning. Describes as a sharp pain rating 7 out of 10 constant. No radiation of the pain no exacerbating remitting factors. Patient also states that he feels that the right lower leg is colder than the left. Patient denies any numbness or tingling in the lower extremity. No fevers, chills, nausea, vomiting, diarrhea, constipation. Brief Synopsis: Patient was evaluated by vascular surgery. Consideration for vascular procedures discussed. Multiple attempts were had to contact POA as well as living facility. Palliative care and psychiatry also consulted. Ultimately, it was decided that conservative management would be the best approach due to the complexity of his pathology and will require multiple surgeries. I appreciate the collaboration with consultants. Vascular surgery signed off and patient continued to defervesce. Delays in discharge due to discharge planning. Patient was discharged in stable condition. Procedures during hospitalization:   None    Incidental or significant findings and recommendations (brief descriptions):  Per Brief Synopsis of Hospital Course    Lab/Test results pending at Discharge:   None    Consultants:  Vascular surgery, psychiatry, palliative care    Discharge Medication List:     Discharge Medications        START taking these medications        Instructions Prescription details   aspirin 81 MG Tbec      Take 1 tablet (81 mg total) by mouth daily. Quantity: 90 tablet  Refills: 0     atorvastatin 40 MG Tabs  Commonly known as: Lipitor      Take 1 tablet (40 mg total) by mouth nightly. Quantity: 90 tablet  Refills: 0     rivaroxaban 2.5 MG Tabs  Commonly known as: Xarelto      Take 1 tablet (2.5 mg total) by mouth 2 (two) times daily with meals. Quantity: 60 tablet  Refills: 0            CONTINUE taking these medications        Instructions Prescription details   acetaminophen 325 MG Tabs  Commonly known as: Tylenol      Take by mouth. Refills: 0     Cholecalciferol 1.25 MG (80586 UT) Tabs      Take by mouth. Refills: 0     docusate calcium 240 MG Caps  Commonly known as: Surfak      Take by mouth. Refills: 0     gabapentin 100 MG Caps  Commonly known as: Neurontin  Notes to patient: Take 2x per day. Take by mouth. Refills: 0     HYDROcodone-acetaminophen 5-325 MG Tabs  Commonly known as: Norco      Take 1 tablet by mouth as needed for Pain. Quantity: 15 tablet  Refills: 0     mirtazapine 7.5 MG Tabs  Commonly known as: Remeron      Take by mouth nightly.    Refills: 0               Where to Get Your Medications        Please  your prescriptions at the location directed by your doctor or nurse    Bring a paper prescription for each of these medications  aspirin 81 MG Tbec  atorvastatin 40 MG Tabs  HYDROcodone-acetaminophen 5-325 MG Tabs  rivaroxaban 2.5 MG Tabs         Follow-up appointment:   Evelyn Gifford MD  [address] [place] [address]  [phone]    Follow up in 1 month(s)      Nonstaff, Physician    Follow up in 1 week(s)        -----------------------------------------------------------------------------------------------  PATIENT DISCHARGE INSTRUCTIONS: See electronic chart    Carlyle Dukes MD 10/17/2023    Time spent: 33 minutes